# Patient Record
Sex: MALE | Race: WHITE | Employment: FULL TIME | URBAN - METROPOLITAN AREA
[De-identification: names, ages, dates, MRNs, and addresses within clinical notes are randomized per-mention and may not be internally consistent; named-entity substitution may affect disease eponyms.]

---

## 2021-05-11 ENCOUNTER — HOSPITAL ENCOUNTER (EMERGENCY)
Age: 26
Discharge: LEFT BEFORE DISCHARGE | End: 2021-05-11
Attending: EMERGENCY MEDICINE

## 2021-05-11 VITALS
RESPIRATION RATE: 18 BRPM | HEIGHT: 70 IN | TEMPERATURE: 98.5 F | OXYGEN SATURATION: 98 % | WEIGHT: 315 LBS | SYSTOLIC BLOOD PRESSURE: 118 MMHG | HEART RATE: 104 BPM | DIASTOLIC BLOOD PRESSURE: 75 MMHG | BODY MASS INDEX: 45.1 KG/M2

## 2021-05-11 PROCEDURE — 75810000275 HC EMERGENCY DEPT VISIT NO LEVEL OF CARE

## 2021-05-11 RX ORDER — FAMOTIDINE 20 MG/1
20 TABLET, FILM COATED ORAL
Status: DISCONTINUED | OUTPATIENT
Start: 2021-05-11 | End: 2021-05-11

## 2021-05-11 RX ORDER — DIPHENHYDRAMINE HCL 25 MG
25 CAPSULE ORAL
Status: DISCONTINUED | OUTPATIENT
Start: 2021-05-11 | End: 2021-05-11

## 2021-05-11 NOTE — PROGRESS NOTES
Orders placed for medications prior to evaluating patient. Upon presenting to bedside, patient was not present in his bed. Attempts were made to locate patient. I personally never saw or evaluated patient. Pt left without being seen after triage. Pt was present in ED less than 1 hour before he left.

## 2021-05-11 NOTE — ED TRIAGE NOTES
Pt states he had allergic reaction to shrimp, had shrimp 3 hours ago, started having SOB and cough 10-15 mins after. O2 sat 98% RA. Mask applied to pt.

## 2022-10-18 ENCOUNTER — TELEMEDICINE (OUTPATIENT)
Dept: INTERNAL MEDICINE CLINIC | Facility: CLINIC | Age: 27
End: 2022-10-18
Payer: COMMERCIAL

## 2022-10-18 DIAGNOSIS — R31.9 HEMATURIA, UNSPECIFIED TYPE: Primary | ICD-10-CM

## 2022-10-18 DIAGNOSIS — J06.9 UPPER RESPIRATORY TRACT INFECTION, UNSPECIFIED TYPE: ICD-10-CM

## 2022-10-18 PROCEDURE — 99203 OFFICE O/P NEW LOW 30 MIN: CPT | Performed by: INTERNAL MEDICINE

## 2022-10-18 PROCEDURE — G8421 BMI NOT CALCULATED: HCPCS | Performed by: INTERNAL MEDICINE

## 2022-10-18 PROCEDURE — G8427 DOCREV CUR MEDS BY ELIG CLIN: HCPCS | Performed by: INTERNAL MEDICINE

## 2022-10-18 PROCEDURE — G8484 FLU IMMUNIZE NO ADMIN: HCPCS | Performed by: INTERNAL MEDICINE

## 2022-10-18 PROCEDURE — 4004F PT TOBACCO SCREEN RCVD TLK: CPT | Performed by: INTERNAL MEDICINE

## 2022-10-18 NOTE — PROGRESS NOTES
FOLLOW UP VISIT    Subjective:    Felipe Baez (: 1995) is a 27 y.o., male,   Chief Complaint   Patient presents with    Establish Care       HPI:  27year old states for 4 days when he urinates there is blood and his penile tip also sensitive. He states he feels weak and has a head cold. He worked remote. He is having to urinate 2-3 times an hour . He has a wife and has not had known sexual exposure. He has no burning or pain . He states it is mostly at the end of his urination. He has no abdominal or back pain. He has had a head cold that started  . He states that he feels congestion and has weakness. He has taken covid test negative. Mucsus is normal .        The following portions of the patient's history were reviewed and updated as appropriate:      No past medical history on file. No past surgical history on file. No family history on file. Social History     Socioeconomic History    Marital status:      Spouse name: Not on file    Number of children: Not on file    Years of education: Not on file    Highest education level: Not on file   Occupational History    Not on file   Tobacco Use    Smoking status: Not on file    Smokeless tobacco: Not on file   Substance and Sexual Activity    Alcohol use: Not on file    Drug use: Not on file    Sexual activity: Not on file   Other Topics Concern    Not on file   Social History Narrative    Not on file     Social Determinants of Health     Financial Resource Strain: Not on file   Food Insecurity: Not on file   Transportation Needs: Not on file   Physical Activity: Not on file   Stress: Not on file   Social Connections: Not on file   Intimate Partner Violence: Not on file   Housing Stability: Not on file       No current outpatient medications on file. No current facility-administered medications for this visit. Allergies as of 10/18/2022    (No Known Allergies)       Review of Systems    Objective:     There were no vitals taken for this visit. Physical Exam    No results found for this visit on 10/18/22. Assessent & Plan     Diagnosis Orders   1. Hematuria, unspecified type  Urinalysis with Reflex to Culture    XR ABDOMEN (KUB) (SINGLE AP VIEW)    Basic Metabolic Panel      2. Upper respiratory tract infection, unspecified type  CBC with Auto Differential             URI viral symptomatic treatment and rest if worse rtc  Hematuria-  ? Kidney stone     Marianne Mtz was evaluated through a synchronous (real-time) audio-video encounter, and/or her healthcare decision maker, is aware that it is a billable service, which includes applicable co-pays, with coverage as determined by her insurance carrier. She provided verbal consent to proceed and patient identification was verified. This visit was conducted pursuant to the emergency declaration under the [address] [place] [address] authority and the [employer] Act. A caregiver was present when appropriate. Ability to conduct physical exam was limited. The patient was located at home in a state where the provider was licensed to provide care. The patient and/or patient representative voiced understanding and agreement with the current diagnoses, recommendations, and possible side effects. No follow-up provider specified.       Anna Gutierrez MD

## 2022-10-20 ENCOUNTER — TELEPHONE (OUTPATIENT)
Dept: INTERNAL MEDICINE CLINIC | Facility: CLINIC | Age: 27
End: 2022-10-20

## 2022-10-20 ENCOUNTER — HOSPITAL ENCOUNTER (OUTPATIENT)
Dept: GENERAL RADIOLOGY | Age: 27
Discharge: HOME | End: 2022-10-23
Payer: COMMERCIAL

## 2022-10-20 DIAGNOSIS — R31.9 HEMATURIA, UNSPECIFIED TYPE: ICD-10-CM

## 2022-10-20 DIAGNOSIS — J06.9 UPPER RESPIRATORY TRACT INFECTION, UNSPECIFIED TYPE: ICD-10-CM

## 2022-10-20 LAB
ANION GAP SERPL CALC-SCNC: 9 MMOL/L (ref 2–11)
APPEARANCE UR: ABNORMAL
BACTERIA URNS QL MICRO: NEGATIVE /HPF
BASOPHILS # BLD: 0.1 K/UL (ref 0–0.2)
BASOPHILS NFR BLD: 0 % (ref 0–2)
BILIRUB UR QL: NEGATIVE
BUN SERPL-MCNC: 13 MG/DL (ref 6–23)
CALCIUM SERPL-MCNC: 8.9 MG/DL (ref 8.3–10.4)
CASTS URNS QL MICRO: ABNORMAL /LPF
CHLORIDE SERPL-SCNC: 111 MMOL/L (ref 101–110)
CO2 SERPL-SCNC: 21 MMOL/L (ref 21–32)
COLOR UR: ABNORMAL
CREAT SERPL-MCNC: 0.8 MG/DL (ref 0.8–1.5)
DIFFERENTIAL METHOD BLD: ABNORMAL
EOSINOPHIL # BLD: 0.3 K/UL (ref 0–0.8)
EOSINOPHIL NFR BLD: 3 % (ref 0.5–7.8)
EPI CELLS #/AREA URNS HPF: ABNORMAL /HPF
ERYTHROCYTE [DISTWIDTH] IN BLOOD BY AUTOMATED COUNT: 13.3 % (ref 11.9–14.6)
GLUCOSE SERPL-MCNC: 90 MG/DL (ref 65–100)
GLUCOSE UR STRIP.AUTO-MCNC: NEGATIVE MG/DL
HCT VFR BLD AUTO: 48.3 % (ref 41.1–50.3)
HGB BLD-MCNC: 15.3 G/DL (ref 13.6–17.2)
HGB UR QL STRIP: ABNORMAL
IMM GRANULOCYTES # BLD AUTO: 0.1 K/UL (ref 0–0.5)
IMM GRANULOCYTES NFR BLD AUTO: 1 % (ref 0–5)
KETONES UR QL STRIP.AUTO: NEGATIVE MG/DL
LEUKOCYTE ESTERASE UR QL STRIP.AUTO: ABNORMAL
LYMPHOCYTES # BLD: 3.2 K/UL (ref 0.5–4.6)
LYMPHOCYTES NFR BLD: 28 % (ref 13–44)
MCH RBC QN AUTO: 27.8 PG (ref 26.1–32.9)
MCHC RBC AUTO-ENTMCNC: 31.7 G/DL (ref 31.4–35)
MCV RBC AUTO: 87.8 FL (ref 82–102)
MONOCYTES # BLD: 0.9 K/UL (ref 0.1–1.3)
MONOCYTES NFR BLD: 8 % (ref 4–12)
MUCOUS THREADS URNS QL MICRO: 0 /LPF
NEUTS SEG # BLD: 6.8 K/UL (ref 1.7–8.2)
NEUTS SEG NFR BLD: 60 % (ref 43–78)
NITRITE UR QL STRIP.AUTO: NEGATIVE
NRBC # BLD: 0 K/UL (ref 0–0.2)
PH UR STRIP: 5.5 [PH] (ref 5–9)
PLATELET # BLD AUTO: 356 K/UL (ref 150–450)
PMV BLD AUTO: 10.2 FL (ref 9.4–12.3)
POTASSIUM SERPL-SCNC: 4.8 MMOL/L (ref 3.5–5.1)
PROT UR STRIP-MCNC: ABNORMAL MG/DL
RBC # BLD AUTO: 5.5 M/UL (ref 4.23–5.6)
RBC #/AREA URNS HPF: ABNORMAL /HPF
SODIUM SERPL-SCNC: 141 MMOL/L (ref 133–143)
SP GR UR REFRACTOMETRY: 1.02 (ref 1–1.02)
URINE CULTURE IF INDICATED: ABNORMAL
UROBILINOGEN UR QL STRIP.AUTO: 0.2 EU/DL (ref 0.2–1)
WBC # BLD AUTO: 11.2 K/UL (ref 4.3–11.1)
WBC URNS QL MICRO: >100 /HPF

## 2022-10-20 PROCEDURE — 74018 RADEX ABDOMEN 1 VIEW: CPT

## 2022-10-20 NOTE — TELEPHONE ENCOUNTER
----- Message from [address] sent at 10/20/2022  8:57 AM EDT -----  Subject: Appointment Request    Reason for Call: Established Patient Appointment needed: Routine Physical   Exam    QUESTIONS    Reason for appointment request? Available appointments did not meet   patient need     Additional Information for Provider? Patient calling back to book Yearly   Physical w/ fasting labs per  during [place] [id number].  Would like an appt Nov 15th.   please call to discuss appt options and book  ---------------------------------------------------------------------------  --------------  [address]  [phone]; OK to leave message on voicemail  ---------------------------------------------------------------------------  --------------  SCRIPT ANSWERS  COVID Screen: [address]

## 2022-10-20 NOTE — TELEPHONE ENCOUNTER
----- Message from [facility] Nilton Garcia sent at 10/20/2022  8:57 AM EDT -----  Subject: Results Request    QUESTIONS  Results: LABs + Brittnee;  Ordered by: Tricia Swain   Date Performed: 2022-10-20  ---------------------------------------------------------------------------  --------------  Bharat BOSWELL    [phone]; OK to leave message on voicemail  ---------------------------------------------------------------------------  --------------

## 2022-10-20 NOTE — TELEPHONE ENCOUNTER
Patient notified of xray results and instructions, per Dr Celia Cobb note, and voiced understanding. I explained that the lab results are not in yet.

## 2022-10-20 NOTE — TELEPHONE ENCOUNTER
His phone call was returned, but the patient didn't answered and I left him a voice mail message to call us back to schedule his physical appointment.

## 2022-10-22 LAB
BACTERIA SPEC CULT: NORMAL
SERVICE CMNT-IMP: NORMAL

## 2022-11-03 ENCOUNTER — HOSPITAL ENCOUNTER (EMERGENCY)
Dept: GENERAL RADIOLOGY | Age: 27
Discharge: HOME | End: 2022-11-06
Payer: COMMERCIAL

## 2022-11-03 ENCOUNTER — HOSPITAL ENCOUNTER (EMERGENCY)
Age: 27
Discharge: HOME | End: 2022-11-03
Attending: EMERGENCY MEDICINE
Payer: COMMERCIAL

## 2022-11-03 VITALS
BODY MASS INDEX: 45.1 KG/M2 | OXYGEN SATURATION: 96 % | HEIGHT: 70 IN | RESPIRATION RATE: 17 BRPM | TEMPERATURE: 98.1 F | HEART RATE: 111 BPM | SYSTOLIC BLOOD PRESSURE: 124 MMHG | WEIGHT: 315 LBS | DIASTOLIC BLOOD PRESSURE: 75 MMHG

## 2022-11-03 DIAGNOSIS — S93.402A SPRAIN OF LEFT ANKLE, UNSPECIFIED LIGAMENT, INITIAL ENCOUNTER: Primary | ICD-10-CM

## 2022-11-03 PROCEDURE — 73610 X-RAY EXAM OF ANKLE: CPT

## 2022-11-03 PROCEDURE — 99283 EMERGENCY DEPT VISIT LOW MDM: CPT

## 2022-11-03 RX ORDER — CIPROFLOXACIN 500 MG/1
500 TABLET, FILM COATED ORAL 2 TIMES DAILY
COMMUNITY

## 2022-11-03 ASSESSMENT — PAIN - FUNCTIONAL ASSESSMENT: PAIN_FUNCTIONAL_ASSESSMENT: 0-10

## 2022-11-03 ASSESSMENT — PAIN SCALES - GENERAL: PAINLEVEL_OUTOF10: 7

## 2022-11-03 ASSESSMENT — ENCOUNTER SYMPTOMS: SHORTNESS OF BREATH: 0

## 2022-11-04 NOTE — ED NOTES
Patient was playing soccer around 8:15 and jumped and landed on it wrong, twisted ankle.        Walt Parks RN  11/03/22 6772

## 2022-11-04 NOTE — ED PROVIDER NOTES
Emergency Department Provider Note                   PCP:                Evans Morgan MD               Age: 27 y.o. Sex: male       ICD-10-CM    1. Sprain of left ankle, unspecified ligament, initial encounter  S93.402A           DISPOSITION Decision To Discharge 11/03/2022 09:38:29 PM        MDM  Number of Diagnoses or Management Options  Sprain of left ankle, unspecified ligament, initial encounter: new, needed workup  Diagnosis management comments: Otherwise healthy 62-year-old male who presents emergency department today with complaint of left ankle pain. Patient appears in no acute distress today. There is swelling and tenderness to the lateral left ankle. He is neurovascularly intact. No wounds noted. X-rays negative for acute process. Symptoms consistent with a sprain. Conservative treatment discussed and encouraged. We will provide orthopedics information if needed for follow-up. Patient provided with crutches in the emergency department. Red flag symptoms and return precautions discussed. Patient verbalizes understanding agreement with instructions, treatment plan, discharge. Amount and/or Complexity of Data Reviewed  Tests in the radiology section of CPT®: reviewed  Review and summarize past medical records: yes  Independent visualization of images, tracings, or specimens: yes    Risk of Complications, Morbidity, and/or Mortality  Presenting problems: low  Diagnostic procedures: low  Management options: low    Patient Progress  Patient progress: improved    Orders Placed This Encounter   Procedures    XR ANKLE LEFT (MIN 3 VIEWS)    Apply ace wrap    [employer] Crutches; Pair, Left Side Injury;  Tall (5'10\"-6'6\")        Medications - No data to display    New Prescriptions    No medications on file        Johnson Britt is a 27 y.o. male who presents to the Emergency Department with chief complaint of    Chief Complaint   Patient presents with    Ankle Pain 53-year-old male who presents emergency department today with complaint of left ankle pain. Patient states he was helping to  soccer when he twisted his left ankle. He states he did fall onto his back but denies any other injury, head injury, or loss of consciousness. His only complaint today is the left ankle pain. He states that they applied a splint but denies other treatment prior to arrival.    The history is provided by the patient. Ankle Problem  Location:  Ankle  Injury: yes    Mechanism of injury: fall    Ankle location:  L ankle  Pain details:     Quality:  Aching    Radiates to:  Does not radiate    Severity:  Moderate    Onset quality:  Sudden    Timing:  Constant    Progression:  Unchanged  Chronicity:  New  Relieved by:  Immobilization  Worsened by:  Bearing weight  Ineffective treatments:  None tried  Associated symptoms: decreased ROM and swelling    Associated symptoms: no fever, no muscle weakness and no numbness        Review of Systems   Constitutional:  Negative for fever. Respiratory:  Negative for shortness of breath. Cardiovascular:  Negative for chest pain. Musculoskeletal:  Positive for arthralgias. All other systems reviewed and are negative. History reviewed. No pertinent past medical history. History reviewed. No pertinent surgical history. History reviewed. No pertinent family history. Social History     Socioeconomic History    Marital status:      Spouse name: None    Number of children: None    Years of education: None    Highest education level: None         Patient has no known allergies. Previous Medications    CIPROFLOXACIN (CIPRO) 500 MG TABLET    Take 500 mg by mouth 2 times daily        Vitals signs and nursing note reviewed. Patient Vitals for the past 4 hrs:   Temp Pulse Resp BP SpO2   11/03/22 2103 98.1 °F (36.7 °C) (!) 111 17 124/75 96 %          Physical Exam  Vitals and nursing note reviewed.    Constitutional: General: He is not in acute distress. Appearance: Normal appearance. He is not ill-appearing, toxic-appearing or diaphoretic. HENT:      Head: Normocephalic and atraumatic. Right Ear: External ear normal.      Left Ear: External ear normal.      Nose: Nose normal.   Eyes:      Extraocular Movements: Extraocular movements intact. Conjunctiva/sclera: Conjunctivae normal.   Cardiovascular:      Rate and Rhythm: Normal rate. Pulmonary:      Effort: Pulmonary effort is normal. No respiratory distress. Abdominal:      General: Abdomen is flat. There is no distension. Musculoskeletal:      Cervical back: Normal range of motion. Left ankle: Swelling present. No deformity. Tenderness present. Decreased range of motion. Normal pulse. Left foot: Normal.      Comments: Tenderness and swelling to lateral left ankle. Range of motion is decreased secondary to pain at time of exam.  Patient has intact pedal pulses, capillary refill, and sensation. No wounds noted. Skin:     General: Skin is warm and dry. Capillary Refill: Capillary refill takes less than 2 seconds. Neurological:      General: No focal deficit present. Mental Status: He is alert and oriented to person, place, and time. Psychiatric:         Mood and Affect: Mood normal.         Behavior: Behavior normal.         Thought Content: Thought content normal.         Judgment: Judgment normal.        Procedures    Results for orders placed or performed during the hospital encounter of 11/03/22   XR ANKLE LEFT (MIN 3 VIEWS)    Narrative    LEFT ANKLE SERIES 11/3/2022 9:20 PM    HISTORY: ankle injury    COMPARISON:  None available. FINDINGS: The ankle mortise is well aligned. There is no displaced fracture. Soft tissues are normal.        Impression    No acute findings. XR ANKLE LEFT (MIN 3 VIEWS)   Final Result   No acute findings.                           Voice dictation software was used during the making of this note. This software is not perfect and grammatical and other typographical errors may be present. This note has not been completely proofread for errors.        Alexandra Jang, APRN - [address]  11/03/22 3066

## 2022-11-04 NOTE — ED TRIAGE NOTES
Pt WC to triage with c/o left ankle pain/injury he sustained while playing soccer at [address]. Pt presents with ankle wrapped by an . Pt denies taking anything for pain.

## 2022-11-04 NOTE — DISCHARGE INSTRUCTIONS
Take ibuprofen 800 mg every 8 hours to help with pain and swelling. Elevate your ankle when at rest.  Apply ice for 10 to 15 minutes every 1-2 hours for the next 1 to 2 days. It is okay to bear weight as tolerated. Use crutches as needed. As we discussed, begin rehab exercises as soon as you are able to tolerate. Orthopedics information provided if needed for follow-up. Return to the emergency department for any new, worsening, or concerning symptoms.

## 2022-11-04 NOTE — ED NOTES
I have reviewed discharge instructions with the patient and spouse. The patient and spouse verbalized understanding. Patient left ED via Discharge Method: wheelchair to Home with family. Opportunity for questions and clarification provided. Patient given 0 scripts. To continue your aftercare when you leave the hospital, you may receive an automated call from our care team to check in on how you are doing. This is a free service and part of our promise to provide the best care and service to meet your aftercare needs.  If you have questions, or wish to unsubscribe from this service please call [phone]. Thank you for Choosing our [facility] Emergency Department.           Devan Menjivar RN  11/03/22 0407